# Patient Record
Sex: MALE | Race: BLACK OR AFRICAN AMERICAN | NOT HISPANIC OR LATINO | Employment: FULL TIME | URBAN - METROPOLITAN AREA
[De-identification: names, ages, dates, MRNs, and addresses within clinical notes are randomized per-mention and may not be internally consistent; named-entity substitution may affect disease eponyms.]

---

## 2022-07-22 ENCOUNTER — HOSPITAL ENCOUNTER (EMERGENCY)
Facility: HOSPITAL | Age: 37
Discharge: HOME | End: 2022-07-22
Attending: EMERGENCY MEDICINE
Payer: MEDICAID

## 2022-07-22 VITALS
BODY MASS INDEX: 46.65 KG/M2 | WEIGHT: 315 LBS | OXYGEN SATURATION: 96 % | TEMPERATURE: 100.2 F | HEIGHT: 69 IN | SYSTOLIC BLOOD PRESSURE: 140 MMHG | RESPIRATION RATE: 20 BRPM | DIASTOLIC BLOOD PRESSURE: 86 MMHG | HEART RATE: 90 BPM

## 2022-07-22 DIAGNOSIS — S61.219A FINGER LACERATION: Primary | ICD-10-CM

## 2022-07-22 PROCEDURE — 12001 RPR S/N/AX/GEN/TRNK 2.5CM/<: CPT | Performed by: EMERGENCY MEDICINE

## 2022-07-22 PROCEDURE — 90471 IMMUNIZATION ADMIN: CPT

## 2022-07-22 PROCEDURE — 90715 TDAP VACCINE 7 YRS/> IM: CPT | Performed by: EMERGENCY MEDICINE

## 2022-07-22 PROCEDURE — 99284 EMERGENCY DEPT VISIT MOD MDM: CPT | Performed by: EMERGENCY MEDICINE

## 2022-07-22 PROCEDURE — 99282 EMERGENCY DEPT VISIT SF MDM: CPT

## 2022-07-22 RX ORDER — SPIRONOLACTONE 25 MG/1
25 TABLET ORAL DAILY
COMMUNITY
Start: 2022-06-23

## 2022-07-22 RX ORDER — LIDOCAINE HYDROCHLORIDE 10 MG/ML
5 INJECTION, SOLUTION EPIDURAL; INFILTRATION; INTRACAUDAL; PERINEURAL ONCE
Status: COMPLETED | OUTPATIENT
Start: 2022-07-22 | End: 2022-07-22

## 2022-07-22 RX ORDER — GINSENG 100 MG
1 CAPSULE ORAL ONCE
Status: COMPLETED | OUTPATIENT
Start: 2022-07-22 | End: 2022-07-22

## 2022-07-22 RX ORDER — AMLODIPINE BESYLATE 10 MG/1
10 TABLET ORAL DAILY
COMMUNITY
Start: 2022-06-23

## 2022-07-22 RX ORDER — INSULIN LISPRO 100 [IU]/ML
INJECTION, SOLUTION INTRAVENOUS; SUBCUTANEOUS
COMMUNITY
Start: 2022-07-05

## 2022-07-22 RX ORDER — TORSEMIDE 20 MG/1
20 TABLET ORAL DAILY
COMMUNITY
Start: 2022-06-23

## 2022-07-22 RX ORDER — LISINOPRIL 20 MG/1
20 TABLET ORAL DAILY
COMMUNITY
Start: 2022-06-23

## 2022-07-22 RX ORDER — PRAVASTATIN SODIUM 40 MG
40 TABLET ORAL DAILY
COMMUNITY
Start: 2022-06-23

## 2022-07-22 RX ORDER — INSULIN GLARGINE 100 [IU]/ML
30 INJECTION, SOLUTION SUBCUTANEOUS 2 TIMES DAILY
COMMUNITY
Start: 2022-07-07

## 2022-07-22 RX ORDER — METOPROLOL SUCCINATE 50 MG/1
50 TABLET, EXTENDED RELEASE ORAL DAILY
COMMUNITY
Start: 2022-06-23

## 2022-07-22 RX ORDER — IBUPROFEN 600 MG/1
600 TABLET ORAL ONCE
Status: COMPLETED | OUTPATIENT
Start: 2022-07-22 | End: 2022-07-22

## 2022-07-22 RX ADMIN — TETANUS TOXOID, REDUCED DIPHTHERIA TOXOID AND ACELLULAR PERTUSSIS VACCINE, ADSORBED 0.5 ML: 5; 2.5; 8; 8; 2.5 SUSPENSION INTRAMUSCULAR at 17:43

## 2022-07-22 RX ADMIN — IBUPROFEN 600 MG: 600 TABLET, FILM COATED ORAL at 17:57

## 2022-07-22 RX ADMIN — BACITRACIN ZINC 1 SMALL APPLICATION: 500 OINTMENT TOPICAL at 17:43

## 2022-07-22 RX ADMIN — LIDOCAINE HYDROCHLORIDE 5 ML: 10 INJECTION, SOLUTION EPIDURAL; INFILTRATION; INTRACAUDAL at 17:14

## 2022-07-22 NOTE — Clinical Note
Miguel Ángel Roberson was seen and treated in our emergency department on 7/22/2022  Limit use of left hand until cleared by physician    Diagnosis:     Magaly Maguire    He may return on this date: If you have any questions or concerns, please don't hesitate to call        Carlota Dudley, DO    ______________________________           _______________          _______________  Hospital Representative                              Date                                Time

## 2022-07-22 NOTE — DISCHARGE INSTRUCTIONS
Return to the emergency department or your primary care physician in 7-10 days for suture removal  Limit use of left hand until cleared by physician

## 2022-07-22 NOTE — ED PROVIDER NOTES
History  Chief Complaint   Patient presents with    Hand Laceration     States he was moving furniture this morning and cut left third finger on a cabinet     Patient is a right-hand-dominant male that presents emergency room with a laceration to his left middle finger  Patient states that he was attempting to move an under-cabinet can opener, when he was accidentally cut by a metal piece  Incident occurred at approx 12noon  He is not concerned for retained foreign body  He is unsure of the date of his last tetanus booster  Patient has a prior medical history of diabetes, hypertension, hyperlipidemia, CHF      History provided by:  Patient   used: No    Finger Laceration  Location:  Finger  Finger laceration location:  L middle finger  Length:  2 5  Depth: Through dermis  Quality: straight    Bleeding: controlled    Laceration mechanism:  Metal edge  Pain details:     Quality:  Aching    Severity:  Mild    Timing:  Constant    Progression:  Unchanged  Foreign body present:  No foreign bodies  Relieved by:  Nothing  Worsened by:  Nothing  Ineffective treatments:  None tried  Tetanus status:  Out of date  Associated symptoms: no fever, no focal weakness, no rash, no redness and no swelling        Prior to Admission Medications   Prescriptions Last Dose Informant Patient Reported? Taking?    Empagliflozin 25 MG TABS   Yes Yes   Sig: Take 25 mg by mouth daily   Insulin Glargine Solostar (Basaglar KwikPen) 100 UNIT/ML SOPN   Yes Yes   Sig: Inject 30 Units under the skin 2 (two) times a day   amLODIPine (NORVASC) 10 mg tablet   Yes Yes   Sig: Take 10 mg by mouth daily   insulin lispro (Admelog SoloStar) 100 units/mL injection pen   Yes Yes   Sig: INJECT 30 UNITS UNDER THE SKIN THREE TIMES A DAY BEFORE MEALS   lisinopril (ZESTRIL) 20 mg tablet   Yes Yes   Sig: Take 20 mg by mouth daily   metFORMIN (GLUCOPHAGE) 850 mg tablet   Yes Yes   Sig: Take 850 mg by mouth daily   metoprolol succinate (TOPROL-XL) 50 mg 24 hr tablet   Yes Yes   Sig: Take 50 mg by mouth daily   pravastatin (PRAVACHOL) 40 mg tablet   Yes Yes   Sig: Take 40 mg by mouth daily   spironolactone (ALDACTONE) 25 mg tablet   Yes Yes   Sig: Take 25 mg by mouth daily   torsemide (DEMADEX) 20 mg tablet   Yes Yes   Sig: Take 20 mg by mouth daily      Facility-Administered Medications: None       Past Medical History:   Diagnosis Date    Asthma     CHF [facility]congestive heart failure) ([facility] )     Diabetes mellitus ([place] [address] )     Hypertension        History reviewed  No pertinent surgical history  History reviewed  No pertinent family history  I have reviewed and agree with the history as documented  E-Cigarette/Vaping    E-Cigarette Use Never User      E-Cigarette/Vaping Substances     Social History     Tobacco Use    Smoking status: Never Smoker    Smokeless tobacco: Never Used   Vaping Use    Vaping Use: Never used   Substance Use Topics    Alcohol use: Yes     Comment: rarely    Drug use: Never       Review of Systems   Constitutional: Negative for chills and fever  Respiratory: Negative for cough, shortness of breath and wheezing  Cardiovascular: Negative for chest pain and palpitations  Gastrointestinal: Negative for abdominal pain, constipation, diarrhea, nausea and vomiting  Genitourinary: Negative for dysuria, flank pain, hematuria and urgency  Musculoskeletal: Negative for back pain  Skin: Positive for wound  Negative for color change and rash  Neurological: Negative for focal weakness  All other systems reviewed and are negative  Physical Exam  Physical Exam  Vitals and nursing note reviewed  Constitutional:       Appearance: He is well-developed  HENT:      Head: Normocephalic and atraumatic  Eyes:      Extraocular Movements: Extraocular movements intact  Pupils: Pupils are equal, round, and reactive to light  Pulmonary:      Effort: Pulmonary effort is normal  No respiratory distress  Musculoskeletal:         General: Tenderness and signs of injury present  Left hand: Laceration present  Hands:       Cervical back: Normal range of motion and neck supple  Skin:     General: Skin is warm and dry  Capillary Refill: Capillary refill takes less than 2 seconds  Neurological:      Mental Status: He is alert and oriented to person, place, and time  Psychiatric:         Behavior: Behavior normal          Thought Content: Thought content normal          Judgment: Judgment normal          Vital Signs  ED Triage Vitals [07/22/22 1647]   Temperature Pulse Respirations Blood Pressure SpO2   100 2 °F (37 9 °C) 90 20 140/86 96 %      Temp Source Heart Rate Source Patient Position - Orthostatic VS BP Location FiO2 (%)   Tympanic Monitor Sitting Left arm --      Pain Score       No Pain           Vitals:    07/22/22 1647   BP: 140/86   Pulse: 90   Patient Position - Orthostatic VS: Sitting         Visual Acuity      ED Medications  Medications   lidocaine (PF) (XYLOCAINE-MPF) 1 % injection 5 mL (5 mL Infiltration Given 7/22/22 1714)   bacitracin topical ointment 1 small application (1 small application Topical Given 7/22/22 1743)   tetanus-diphtheria-acellular pertussis (BOOSTRIX) IM injection 0 5 mL (0 5 mL Intramuscular Given 7/22/22 1743)   ibuprofen (MOTRIN) tablet 600 mg (600 mg Oral Given 7/22/22 1757)       Diagnostic Studies  Results Reviewed     None                 No orders to display              Procedures  Laceration repair    Date/Time: 7/22/2022 5:20 PM  Performed by: Macie Miner DO  Authorized by: Macie Miner DO   Consent: Verbal consent obtained    Risks and benefits: risks, benefits and alternatives were discussed  Consent given by: patient  Patient understanding: patient states understanding of the procedure being performed  Required items: required blood products, implants, devices, and special equipment available  Patient identity confirmed: verbally with patient  Time out: Immediately prior to procedure a "time out" was called to verify the correct patient, procedure, equipment, support staff and site/side marked as required  Body area: upper extremity  Location details: left long finger  Laceration length: 2 5 cm  Foreign bodies: no foreign bodies  Tendon involvement: none  Nerve involvement: none  Vascular damage: no  Anesthesia: local infiltration    Anesthesia:  Local Anesthetic: lidocaine 1% without epinephrine  Anesthetic total: 3 mL    Sedation:  Patient sedated: no      Wound Dehiscence:  Superficial Wound Dehiscence: simple closure      Procedure Details:  Preparation: Patient was prepped and draped in the usual sterile fashion  Irrigation solution: saline  Irrigation method: tap  Amount of cleaning: extensive  Debridement: none  Degree of undermining: none  Skin closure: 3-0 nylon  Number of sutures: 4  Technique: simple  Approximation: close  Approximation difficulty: simple  Dressing: 4x4 sterile gauze, pressure dressing and splint  Patient tolerance: patient tolerated the procedure well with no immediate complications               ED Course                                             MDM  Number of Diagnoses or Management Options  Finger laceration: new and requires workup  Risk of Complications, Morbidity, and/or Mortality  Presenting problems: high  Diagnostic procedures: high  Management options: high    Patient Progress  Patient progress: improved      Disposition  Final diagnoses:   Finger laceration     Time reflects when diagnosis was documented in both MDM as applicable and the Disposition within this note     Time User Action Codes Description Comment    7/22/2022  5:41 PM Venkat Tyson Add [[id number]] Finger laceration       ED Disposition     ED Disposition   Discharge    Condition   --    Date/Time   Fri Jul 22, 2022  6:03 PM    Comment   Joshua Cardona discharge to home/self care                 Follow-up Information     [facility]  For wound re-check, for follow up [address]  [phone]            Discharge Medication List as of 7/22/2022  5:42 PM      CONTINUE these medications which have NOT CHANGED    Details   amLODIPine (NORVASC) 10 mg tablet Take 10 mg by mouth daily, Starting Thu 6/23/2022, Historical Med      Empagliflozin 25 MG TABS Take 25 mg by mouth daily, Starting Thu 6/23/2022, Historical Med      Insulin Glargine Solostar (Basaglar KwikPen) 100 UNIT/ML SOPN Inject 30 Units under the skin 2 (two) times a day, Starting Thu 7/7/2022, Historical Med      insulin lispro (Admelog SoloStar) 100 units/mL injection pen INJECT 30 UNITS UNDER THE SKIN THREE TIMES A DAY BEFORE MEALS, Historical Med      lisinopril (ZESTRIL) 20 mg tablet Take 20 mg by mouth daily, Starting Thu 6/23/2022, Historical Med      metFORMIN (GLUCOPHAGE) 850 mg tablet Take 850 mg by mouth daily, Starting Thu 6/23/2022, Historical Med      metoprolol succinate (TOPROL-XL) 50 mg 24 hr tablet Take 50 mg by mouth daily, Starting Thu 6/23/2022, Historical Med      pravastatin (PRAVACHOL) 40 mg tablet Take 40 mg by mouth daily, Starting Thu 6/23/2022, Historical Med      spironolactone (ALDACTONE) 25 mg tablet Take 25 mg by mouth daily, Starting Thu 6/23/2022, Historical Med      torsemide (DEMADEX) 20 mg tablet Take 20 mg by mouth daily, Starting Thu 6/23/2022, Historical Med             No discharge procedures on file      PDMP Review     None          ED Provider  Electronically Signed by           Emily Urias DO  07/23/22 0751